# Patient Record
(demographics unavailable — no encounter records)

---

## 2018-02-24 NOTE — RAD
CHEST 1 VIEW AND ABDOMEN 2 VIEWS:

 

Date:  02/24/18 

 

HISTORY:  

Left-sided abdominal pain. 

 

FINDINGS/IMPRESSION: 

The heart size is normal. No confluent areas of consolidation, pneumothorax, or pleural effusions are
 seen. No free air or differential fluid levels are identified. There are degenerative changes in the
 spine. There is fecal material in the colon. 

 

 

POS: [facility]